# Patient Record
Sex: MALE | Race: WHITE | NOT HISPANIC OR LATINO | Employment: OTHER | URBAN - METROPOLITAN AREA
[De-identification: names, ages, dates, MRNs, and addresses within clinical notes are randomized per-mention and may not be internally consistent; named-entity substitution may affect disease eponyms.]

---

## 2020-11-02 ENCOUNTER — HOSPITAL ENCOUNTER (EMERGENCY)
Facility: MEDICAL CENTER | Age: 60
End: 2020-11-02
Attending: EMERGENCY MEDICINE
Payer: MEDICARE

## 2020-11-02 VITALS
DIASTOLIC BLOOD PRESSURE: 71 MMHG | BODY MASS INDEX: 34.07 KG/M2 | HEIGHT: 70 IN | OXYGEN SATURATION: 95 % | WEIGHT: 238 LBS | TEMPERATURE: 97.8 F | SYSTOLIC BLOOD PRESSURE: 115 MMHG | RESPIRATION RATE: 18 BRPM | HEART RATE: 88 BPM

## 2020-11-02 DIAGNOSIS — R19.8 ABNORMAL FINDINGS ON ESOPHAGOGASTRODUODENOSCOPY (EGD): ICD-10-CM

## 2020-11-02 DIAGNOSIS — Z92.83: ICD-10-CM
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
## 2020-11-02 DIAGNOSIS — T18.128A ESOPHAGEAL OBSTRUCTION DUE TO FOOD IMPACTION: ICD-10-CM

## 2020-11-02 LAB
ALBUMIN SERPL BCP-MCNC: 4.8 G/DL (ref 3.2–4.9)
ALBUMIN/GLOB SERPL: 1.2 G/DL
ALP SERPL-CCNC: 55 U/L (ref 30–99)
ALT SERPL-CCNC: 26 U/L (ref 2–50)
ANION GAP SERPL CALC-SCNC: 15 MMOL/L (ref 7–16)
AST SERPL-CCNC: 22 U/L (ref 12–45)
BASOPHILS # BLD AUTO: 0.9 % (ref 0–1.8)
BASOPHILS # BLD: 0.11 K/UL (ref 0–0.12)
BILIRUB SERPL-MCNC: 0.4 MG/DL (ref 0.1–1.5)
BUN SERPL-MCNC: 15 MG/DL (ref 8–22)
CALCIUM SERPL-MCNC: 10.2 MG/DL (ref 8.4–10.2)
CHLORIDE SERPL-SCNC: 102 MMOL/L (ref 96–112)
CO2 SERPL-SCNC: 24 MMOL/L (ref 20–33)
COVID ORDER STATUS COVID19: NORMAL
CREAT SERPL-MCNC: 0.76 MG/DL (ref 0.5–1.4)
EKG IMPRESSION: NORMAL
EOSINOPHIL # BLD AUTO: 0.26 K/UL (ref 0–0.51)
EOSINOPHIL NFR BLD: 2 % (ref 0–6.9)
ERYTHROCYTE [DISTWIDTH] IN BLOOD BY AUTOMATED COUNT: 38.1 FL (ref 35.9–50)
GLOBULIN SER CALC-MCNC: 3.9 G/DL (ref 1.9–3.5)
GLUCOSE SERPL-MCNC: 185 MG/DL (ref 65–99)
HCT VFR BLD AUTO: 48.3 % (ref 42–52)
HGB BLD-MCNC: 16.4 G/DL (ref 14–18)
IMM GRANULOCYTES # BLD AUTO: 0.05 K/UL (ref 0–0.11)
IMM GRANULOCYTES NFR BLD AUTO: 0.4 % (ref 0–0.9)
LYMPHOCYTES # BLD AUTO: 3.01 K/UL (ref 1–4.8)
LYMPHOCYTES NFR BLD: 23.7 % (ref 22–41)
MCH RBC QN AUTO: 28.6 PG (ref 27–33)
MCHC RBC AUTO-ENTMCNC: 34 G/DL (ref 33.7–35.3)
MCV RBC AUTO: 84.1 FL (ref 81.4–97.8)
MONOCYTES # BLD AUTO: 0.72 K/UL (ref 0–0.85)
MONOCYTES NFR BLD AUTO: 5.7 % (ref 0–13.4)
NEUTROPHILS # BLD AUTO: 8.54 K/UL (ref 1.82–7.42)
NEUTROPHILS NFR BLD: 67.3 % (ref 44–72)
NRBC # BLD AUTO: 0 K/UL
NRBC BLD-RTO: 0 /100 WBC
PATHOLOGY CONSULT NOTE: NORMAL
PLATELET # BLD AUTO: 333 K/UL (ref 164–446)
PMV BLD AUTO: 10.5 FL (ref 9–12.9)
POTASSIUM SERPL-SCNC: 4.1 MMOL/L (ref 3.6–5.5)
PROT SERPL-MCNC: 8.7 G/DL (ref 6–8.2)
RBC # BLD AUTO: 5.74 M/UL (ref 4.7–6.1)
SARS-COV+SARS-COV-2 AG RESP QL IA.RAPID: NOTDETECTED
SODIUM SERPL-SCNC: 141 MMOL/L (ref 135–145)
SPECIMEN SOURCE: NORMAL
WBC # BLD AUTO: 12.7 K/UL (ref 4.8–10.8)

## 2020-11-02 PROCEDURE — 700102 HCHG RX REV CODE 250 W/ 637 OVERRIDE(OP): Performed by: EMERGENCY MEDICINE

## 2020-11-02 PROCEDURE — 87426 SARSCOV CORONAVIRUS AG IA: CPT

## 2020-11-02 PROCEDURE — 93005 ELECTROCARDIOGRAM TRACING: CPT | Performed by: EMERGENCY MEDICINE

## 2020-11-02 PROCEDURE — C9803 HOPD COVID-19 SPEC COLLECT: HCPCS | Performed by: EMERGENCY MEDICINE

## 2020-11-02 PROCEDURE — 94770 HCHG CO2 EXPIRED GAS DETERMINATION: CPT | Mod: XU

## 2020-11-02 PROCEDURE — 94760 N-INVAS EAR/PLS OXIMETRY 1: CPT

## 2020-11-02 PROCEDURE — 88312 SPECIAL STAINS GROUP 1: CPT

## 2020-11-02 PROCEDURE — 85025 COMPLETE CBC W/AUTO DIFF WBC: CPT

## 2020-11-02 PROCEDURE — 160048 HCHG OR STATISTICAL LEVEL 1-5: Performed by: INTERNAL MEDICINE

## 2020-11-02 PROCEDURE — A9270 NON-COVERED ITEM OR SERVICE: HCPCS | Performed by: EMERGENCY MEDICINE

## 2020-11-02 PROCEDURE — 96374 THER/PROPH/DIAG INJ IV PUSH: CPT | Mod: XU

## 2020-11-02 PROCEDURE — 500066 HCHG BITE BLOCK, ECT: Performed by: INTERNAL MEDICINE

## 2020-11-02 PROCEDURE — 160202 HCHG ENDO MINUTES - 1ST 30 MINS LEVEL 3: Performed by: INTERNAL MEDICINE

## 2020-11-02 PROCEDURE — 700105 HCHG RX REV CODE 258: Performed by: EMERGENCY MEDICINE

## 2020-11-02 PROCEDURE — 700111 HCHG RX REV CODE 636 W/ 250 OVERRIDE (IP): Performed by: EMERGENCY MEDICINE

## 2020-11-02 PROCEDURE — 99285 EMERGENCY DEPT VISIT HI MDM: CPT

## 2020-11-02 PROCEDURE — 80053 COMPREHEN METABOLIC PANEL: CPT

## 2020-11-02 PROCEDURE — 88305 TISSUE EXAM BY PATHOLOGIST: CPT

## 2020-11-02 RX ORDER — ACETAMINOPHEN 500 MG
1000 TABLET ORAL DAILY
COMMUNITY

## 2020-11-02 RX ORDER — FENOFIBRATE 160 MG/1
160 TABLET ORAL
COMMUNITY

## 2020-11-02 RX ORDER — TAMSULOSIN HYDROCHLORIDE 0.4 MG/1
0.4 CAPSULE ORAL
COMMUNITY

## 2020-11-02 RX ORDER — NITROGLYCERIN 0.4 MG/1
0.4 TABLET SUBLINGUAL ONCE
Status: COMPLETED | OUTPATIENT
Start: 2020-11-02 | End: 2020-11-02

## 2020-11-02 RX ORDER — SODIUM CHLORIDE, SODIUM LACTATE, POTASSIUM CHLORIDE, CALCIUM CHLORIDE 600; 310; 30; 20 MG/100ML; MG/100ML; MG/100ML; MG/100ML
1000 INJECTION, SOLUTION INTRAVENOUS ONCE
Status: COMPLETED | OUTPATIENT
Start: 2020-11-02 | End: 2020-11-02

## 2020-11-02 RX ORDER — ROSUVASTATIN CALCIUM 20 MG/1
20 TABLET, COATED ORAL
COMMUNITY

## 2020-11-02 RX ORDER — PANTOPRAZOLE SODIUM 40 MG/1
40 TABLET, DELAYED RELEASE ORAL DAILY
Qty: 30 TAB | Refills: 0 | Status: SHIPPED | OUTPATIENT
Start: 2020-11-02

## 2020-11-02 RX ORDER — LISINOPRIL 20 MG/1
20 TABLET ORAL
COMMUNITY

## 2020-11-02 RX ORDER — IBUPROFEN 200 MG
400 TABLET ORAL DAILY
COMMUNITY

## 2020-11-02 RX ORDER — NATEGLINIDE 120 MG/1
120 TABLET ORAL
COMMUNITY

## 2020-11-02 RX ADMIN — SODIUM CHLORIDE, POTASSIUM CHLORIDE, SODIUM LACTATE AND CALCIUM CHLORIDE 1000 ML: 600; 310; 30; 20 INJECTION, SOLUTION INTRAVENOUS at 18:05

## 2020-11-02 RX ADMIN — PROPOFOL 50 MG: 10 INJECTION, EMULSION INTRAVENOUS at 20:19

## 2020-11-02 RX ADMIN — PROPOFOL 30 MG: 10 INJECTION, EMULSION INTRAVENOUS at 20:18

## 2020-11-02 RX ADMIN — PROPOFOL 20 MG: 10 INJECTION, EMULSION INTRAVENOUS at 20:22

## 2020-11-02 RX ADMIN — PROPOFOL 20 MG: 10 INJECTION, EMULSION INTRAVENOUS at 20:20

## 2020-11-02 RX ADMIN — NITROGLYCERIN 0.4 MG: 0.4 TABLET, ORALLY DISINTEGRATING SUBLINGUAL at 18:05

## 2020-11-02 NOTE — ED TRIAGE NOTES
Pt states was eating lunch around 1230. Pt was eating chunks of beef, mushroom and rice. Pt was taking a bite of the beef and then felt like something was in his throat. Pt states has been unable to keep water down without vomiting it up. Pt states even his saliva can set it off.     Negative covid screening

## 2020-11-03 NOTE — ED NOTES
Med rec updated and complete  Allergies reviewed  Pt had a list of medications, that he read to this writer.  Pt reports no antibiotics in the last 2 weeks

## 2020-11-03 NOTE — ED NOTES
Pt requesting water.   Spoke with ERP who states it is ok to attempt PO challenge if pt is feeling up to it.   Water provided to pt.

## 2020-11-03 NOTE — ED PROVIDER NOTES
ED Provider Note    CHIEF COMPLAINT  Chief Complaint   Patient presents with   • Other       HPI  Brandon Schumacher is a 59 y.o. male with a history of hypertension, diabetes mellitus who presents with a possible esophageal food impaction.  The patient states he was eating which included chunks of beef, mushroom, rice, when he felt like something got stuck in his throat.  He has not been able to eat or drink since that time and water and even his saliva has been coming back up.  The patient says he has never had something like this happen previously.  He feels a slight discomfort but denies any significant pain.  He has been seen by [facility] in the past and has had a previous endoscopy and colonoscopy.  He was told he had a hiatal hernia, but no history of any ulcer disease.  The patient denies any fever, chills, sore throat, cough, congestion, or any difficulty breathing.  He denies any chest pain or abdominal pain.    REVIEW OF SYSTEMS  See HPI for further details. All other systems are negative.     PAST MEDICAL HISTORY  Past Medical History:   Diagnosis Date   • Diabetes (HCC)    • Hypertension        FAMILY HISTORY  History reviewed. No pertinent family history.    SOCIAL HISTORY  Social History     Tobacco Use   • Smoking status: Never Smoker   • Smokeless tobacco: Never Used   Substance Use Topics   • Alcohol use: Not Currently   • Drug use: Not Currently      Social History     Substance and Sexual Activity   Drug Use Not Currently       SURGICAL HISTORY  History reviewed. No pertinent surgical history.    CURRENT MEDICATIONS  Home Medications     Reviewed by Ezequiel Troy (Pharmacy Tech) on 11/02/20 at 1619  Med List Status: Complete    Medication Last Dose Status    acetaminophen (TYLENOL) 500 MG Tab 11/1/2020 Active    fenofibrate (TRIGLIDE) 160 MG tablet 11/1/2020 Active    ibuprofen (MOTRIN) 200 MG Tab 11/1/2020 Active    lisinopril (PRINIVIL) 20 MG Tab  "11/1/2020 Active    multivitamin (THERAGRAN) Tab > 2 days Active    nateglinide (STARLIX) 120 MG Tab 11/1/2020 Active    rosuvastatin (CRESTOR) 20 MG Tab 11/1/2020 Active    tamsulosin (FLOMAX) 0.4 MG capsule 11/1/2020 Active                ALLERGIES  Allergies   Allergen Reactions   • Penicillin G Unspecified     Pt reports that it was a childhood allergie, not sure what happens        PHYSICAL EXAM0  VITAL SIGNS: Blood Pressure 119/72   Pulse (Abnormal) 103   Temperature 36.6 °C (97.8 °F) (Temporal)   Respiration 18   Height 1.778 m (5' 10\")   Weight 108 kg (238 lb)   Oxygen Saturation 90%   Body Mass Index 34.15 kg/m²   Constitutional: Awake, alert, in no acute distress, Non-toxic appearance.   HENT: Atraumatic. Bilateral external ears normal, mucous membranes mildly dry, nonerythematous without exudates, nose is normal.  Eyes: PERRL, EOMI, conjunctiva moist, noninjected.  Neck: Nontender, Normal range of motion, No nuchal rigidity, No stridor.   Lymphatic: No lymphadenopathy noted.   Cardiovascular: Regular rhythm, tachycardic, rate in the 110s 120s, no murmurs, rubs, gallops.  Thorax & Lungs:  Good breath sounds bilaterally, no wheezes, rales, or retractions.  No chest tenderness.  Abdomen: Bowel sounds normal, Soft, nontender, nondistended, no rebound, guarding, masses.  Back: No CVA or spinal tenderness.  Extremities: Intact distal pulses, No edema, No tenderness.   Skin: Warm, Dry, No rashes.   Musculoskeletal: No joint swelling or tenderness.  Neurologic: Alert & oriented x 3, sensory and motor function normal. No focal deficits.   Psychiatric: Affect normal, Judgment normal, Mood normal.     EKG  EKG Interpretation:  Interpreted by me    Rhythm: Sinus tachycardia  Rate: 114  Intervals: Normal  Axis: normal  Ectopy: none  Conduction: normal  ST Segments: no evidence of elevation or depression  T Waves: no acute change  Q Waves: none  Clinical Impression: No acute injury or ischemic pattern. "   Comparison to previous EKG: None      LABS  Labs Reviewed   CBC WITH DIFFERENTIAL - Abnormal; Notable for the following components:       Result Value    WBC 12.7 (*)     Neutrophils (Absolute) 8.54 (*)     All other components within normal limits   COMP METABOLIC PANEL - Abnormal; Notable for the following components:    Glucose 185 (*)     Total Protein 8.7 (*)     Globulin 3.9 (*)     All other components within normal limits   ESTIMATED GFR       All labs reviewed by me.      RADIOLOGY/PROCEDURES  No orders to display       The radiologist's interpretations of all radiological studies have been reviewed by me.        COURSE & MEDICAL DECISION MAKING  Pertinent Labs & Imaging studies reviewed. (See chart for details)  The patient presents with the above complaints.  He appears to have an esophageal food impaction.  He is not able to tolerate fluids, or handling secretions.  IV was placed, he was given a bolus of normal saline as he was tachycardic, and has had no p.o. intake for 5 to 6 hours.  He was given a nitroglycerin sublingual with no improvement of his symptoms.  When he tried to take a sip of water, this was regurgitated back.  EKG shows a sinus tachycardia.  CBC shows a white count 12,700, hemoglobin 16.4, normal differential.  Chemistry shows a glucose of 185, otherwise unremarkable.  [facility] was called, with plans to do a bedside endoscopy at 8 PM.  The endoscopy team has been contacted.      Procedure note:  EGD under moderate sedation for esophageal food impaction.  Risks and benefits were explained to the patient and consent was obtained.  Patient was placed on cardiac and respiratory monitors, supplemental oxygen.  Respiratory therapy was present.  The patient was titrated with propofol by myself and received a total of 120 mg IV over the course of the procedure.  After acquiring adequate sedation, the bedside EGD was performed by Dr. Garcia and the esophageal food obstruction  was noted, and removed.  Patient was also noted to have a developing gastric ulcer.  Biopsies were obtained by Dr. Garcia.  Shortly after the endoscope was removed the patient aroused, and was able to converse.  After about 5 minutes, the patient was fairly awake and was near back to his baseline.  The patient tolerated procedure well without complications.     On recheck at 2055 hrs., the patient is awake, alert, is back to his baseline.  He is tolerating oral fluids.  Findings were discussed with the patient.  He will be placed on Protonix 40 mg daily at the request of Dr. Garcia.  Patient is to call the office for a follow-up appointment tomorrow.  Patient is instructed to make sure he cuts his food into small pieces, chews well, to avoid another food impaction.  He is to return to the ER for any worsening pain, difficulty reading or swallowing, fever, or any other problems.    FINAL IMPRESSION  1. Esophageal obstruction due to food impaction          Electronically signed by: Anthony Hickman M.D., 11/2/2020 4:45 PM

## 2020-11-03 NOTE — OP REPORT
OP Note  Procedure date: 11/2/2020     PreOp Diagnosis: Food impaction    PostOp Diagnosis:   Esophagus:   - Food impaction in the distal esophagus, removed by gently pushed into stomach  - LA-B esophagitis, with mild stricture at GEJ  - Possible reflux esophagitis in the proximal esophagus, s/p cold forceps biopsy   Stomach:  - Some solid food remained in the stomach body  - Patchy gastritis in antrum, s/p cold forceps biopsy   Duodenum:  - Multiple superficial duodenal ulcers (Anderson III) were seen in the duodenal bulb, non-bleeding    Procedure(s):  GASTROSCOPY    Surgeon(s):  Jessenia Garcia M.D.    Anesthesiologist/Type of Anesthesia:  No anesthesia staff entered./* No anesthesia type entered *    Surgical Staff:  * No surgical staff found *    Specimens removed if any:  * No specimens in log *    Estimated Blood Loss: minimal    Anesthesia/Medications:  see ERP anesthesia note     COMPLICATIONS:  No immediate complications.    PROCEDURE IN DETAIL, Findings and ENDOSCOPIC DIAGNOSIS:      -Prior to the procedure, a History and Physical was performed, and patient medications and allergies were reviewed. The patient’s tolerance of previous anesthesia was also reviewed. The risks and benefits of the procedure and the sedation options and risks were discussed with the patient. All questions were answered, and informed consent was obtained. The patient was deemed in satisfactory condition to undergo the procedure.    -Prior Anticoagulants: the patient has taken no previous anticoagulant or antiplatelet agents.    -ASA Grade Assessment: see anesthesia note     -The patient was placed in the left lateral decubitus position. The scope was passed under direct vision. Continuous oxygen was provided via nasal cannula and intravenous sedation was administered in divided doses throughout the procedure. The patient’s blood pressure, pulse, and oxygen saturation were monitored continuously throughout the procedure.    -The  gastroscope was gently advanced under direct visualization over the tongue, down the esophagus, through the stomach and into the 2nd portion of the duodenum. The color, texture, mucosa and anatomy of esophagus, stomach and duodenum were carefully examined with the scope. The scope was then withdrawn from the patient and the procedure terminated. Further details are in the finding section, based on anatomical location.    -The patient tolerated the procedure well and there were no immediate complications. The patient was then transferred to the recovery room in stable condition.    Findings:  Esophagus:   - Food impaction in the distal esophagus, removed by gently pushed into stomach  - LA-B esophagitis, with mild stricture at GEJ  - Possible reflux esophagitis in the proximal esophagus, s/p cold forceps biopsy   Stomach:  - Some solid food remained in the stomach body  - Patchy gastritis in antrum, s/p cold forceps biopsy   Duodenum:  - Multiple superficial duodenal ulcers (Anderson III) were seen in the duodenal bulb, non-bleeding    RECOMMENDATIONS:    1. A letter will be sent regarding to the biopsy result in about 2 weeks.  2. PPI daily AC  3. F/u with Dr. Valles in 2-3 wks  4. Chew food well  5. Will likely need repeat EGD, possible balloon dilation at GEJ as outpatient  6. OK to discharge home once criteria met    11/2/2020 8:31 PM Jessenia Garcia M.D.

## 2020-11-03 NOTE — ED NOTES
Pt standing in room speaking to [facility]. Pt speaking in full sentences showing no signs of distress.

## 2020-11-03 NOTE — CONSULTS
Gastroenterology Consult Note:    Jessenia Garcia M.D.  Date & Time note created:    11/2/2020   8:02 PM     Patient ID:  Name:             Brandon Schumacher  YOB: 1960  Age:                 59 y.o.  male  MRN:               [id number]    Referring MD:  Dr. Hickman                                                             Chief Complaint(s):      Food impaction    History of Present Illness:    This is a very pleasant 59 y.o. male with the past medical history of hypertension, diabetes mellitus who presents with a possible esophageal food impaction.  The patient states he was eating which included chunks of beef, mushroom, rice, when he felt like something got stuck in his throat.  He has not been able to eat or drink since that time and water and even his saliva has been coming back up.  The patient says he has never had something like this happen previously.  If things got stuck, it usually passes within half an hour.  He feels a slight discomfort but denies any significant pain.  He has been seen by [facility] in the past and has had a previous endoscopy and colonoscopy.  He was told he had a hiatal hernia, but no history of any ulcer disease.  The patient denies any fever, chills, sore throat, cough, congestion, or any difficulty breathing.  He denies any chest pain or abdominal pain.    EGD in 2017 reviewed. No stricture or scar.     Otherwise the patient is doing fine without complaints of fever/chills/weight loss/appetite change/heartburn/nausea/vomiting/bloating/constipation/diarrhea/melena/hematochezia or abdominal pain.    Review of Systems:      Constitutional: Denies fevers, weight changes  Eyes: Denies changes in vision, jaundice  Ears/Nose/Throat/Mouth: Denies nasal congestion or sore throat   Cardiovascular: Denies chest pain or palpitations   Respiratory: Denies shortness of breath, denies cough  Gastrointestinal/Hepatic: NEg abdominal pain, nausea,  vomiting, diarrhea, constipation or GI bleeding   Genitourinary: Denies dysuria or frequency  Musculoskeletal/Rheum: Denies  joint pain and swelling, edema  Skin: Denies rash  Neurological: Denies headache, confusion, memory loss or focal weakness/parasthesias  Psychiatric: denies mood disorder   Endocrine: Genet thyroid problems  Heme/Oncology/Lymph Nodes: Denies enlarged lymph nodes, denies brusing or known bleeding disorder  All other systems were reviewed and are negative (AMA/CMS criteria)            ROS    Past Medical History:   Past Medical History:   Diagnosis Date   • Diabetes (HCC)    • Hypertension      There are no active hospital problems to display for this patient.      Past Surgical History:  History reviewed. No pertinent surgical history.    Hospital Medications:  No current facility-administered medications for this encounter.      Last reviewed on 11/2/2020  4:19 PM by Ezequiel Troy      Current Outpatient Medications:  (Not in a hospital admission)      Medication Allergy:  Allergies   Allergen Reactions   • Penicillin G Unspecified     Pt reports that it was a childhood allergie, not sure what happens        Family History:  History reviewed. No pertinent family history.    Social History:  Social History     Socioeconomic History   • Marital status:      Spouse name: Not on file   • Number of children: Not on file   • Years of education: Not on file   • Highest education level: Not on file   Occupational History   • Not on file   Social Needs   • Financial resource strain: Not on file   • Food insecurity     Worry: Not on file     Inability: Not on file   • Transportation needs     Medical: Not on file     Non-medical: Not on file   Tobacco Use   • Smoking status: Never Smoker   • Smokeless tobacco: Never Used   Substance and Sexual Activity   • Alcohol use: Not Currently   • Drug use: Not Currently   • Sexual activity: Not on file   Lifestyle   • Physical activity     Days  "per week: Not on file     Minutes per session: Not on file   • Stress: Not on file   Relationships   • Social connections     Talks on phone: Not on file     Gets together: Not on file     Attends Christianity service: Not on file     Active member of club or organization: Not on file     Attends meetings of clubs or organizations: Not on file     Relationship status: Not on file   • Intimate partner violence     Fear of current or ex partner: Not on file     Emotionally abused: Not on file     Physically abused: Not on file     Forced sexual activity: Not on file   Other Topics Concern   • Not on file   Social History Narrative   • Not on file       Physical Exam:  Weight/BMI: Body mass index is 34.15 kg/m².  /82   Pulse (!) 103   Temp 36.6 °C (97.8 °F) (Temporal)   Resp 18   Ht 1.778 m (5' 10\")   Wt 108 kg (238 lb)   SpO2 90%   Vitals:    11/02/20 1812 11/02/20 1817 11/02/20 1822 11/02/20 1922   BP: 108/64 111/73 119/72 121/82   Pulse: (!) 114 (!) 106 (!) 103    Resp: (!) 34 16 18    Temp:       TempSrc:       SpO2: 93% 92% 90%    Weight:       Height:         Oxygen Therapy:  Pulse Oximetry: 90 %, O2 Delivery Device: None - Room Air  No intake or output data in the 24 hours ending 11/02/20 2002  Physical Exam     Constitutional:   Well developed, well nourished, no acute distress  HEENT:  Normocephalic, Atraumatic, Conjunctiva not pale, Sclera not icteric, Oropharynx moist mucous membranes, No oral exudates, Nose normal.  No thyromegaly.  Neck:  Normal range of motion, No cervical tenderness,  no JVD.  Chest/Lungs:  Symmetric expansion, no spider angioma, breath sounds clear to auscultation bilaterally,  no crackles, no wheezing.   Cardiovascular:  Normal heart rate, Normal rhythm, No murmurs, No rubs, No gallops.    Abdomen: Bowel sounds normal, Soft, No tenderness, No guarding, No rebound, No masses, No hepatosplenomegaly.  Extremities: No cyanosis/clubbing/edema/palmar erythema/flapping tremor  Skin: " Warm, Dry, No erythema, No rash, no induration.    MDM (Data Review):     Records reviewed and summarized in current documentation    Lab Data Review:  Recent Results (from the past 24 hour(s))   EKG    Collection Time: 20  4:52 PM   Result Value Ref Range    Report       [facility]    Test Date:  2020  Pt Name:    ISAURA CHILD             Department: [facility]  MRN:        [id number]                      [facility]  Gender:     Male                         Technician: LEVI  :        1960                   Requested By:DULCE MARIA BLANCO  Order #:    [id number]                    Reading MD: DULCE MARIA BLANCO MD    Measurements  Intervals                                Axis  Rate:       114                          P:          27  NY:         179                          QRS:        40  QRSD:       90                           T:          28  QT:         328  QTc:        452    Interpretive Statements  Sinus tachycardia  No previous ECG available for comparison  Electronically Signed On 2020 18:39:51 PST by DULCE MARIA BLANCO MD     CBC WITH DIFFERENTIAL    Collection Time: 20  5:58 PM   Result Value Ref Range    WBC 12.7 (H) 4.8 - 10.8 K/uL    RBC 5.74 4.70 - 6.10 M/uL    Hemoglobin 16.4 14.0 - 18.0 g/dL    Hematocrit 48.3 42.0 - 52.0 %    MCV 84.1 81.4 - 97.8 fL    MCH 28.6 27.0 - 33.0 pg    MCHC 34.0 33.7 - 35.3 g/dL    RDW 38.1 35.9 - 50.0 fL    Platelet Count 333 164 - 446 K/uL    MPV 10.5 9.0 - 12.9 fL    Neutrophils-Polys 67.30 44.00 - 72.00 %    Lymphocytes 23.70 22.00 - 41.00 %    Monocytes 5.70 0.00 - 13.40 %    Eosinophils 2.00 0.00 - 6.90 %    Basophils 0.90 0.00 - 1.80 %    Immature Granulocytes 0.40 0.00 - 0.90 %    Nucleated RBC 0.00 /100 WBC    Neutrophils (Absolute) 8.54 (H) 1.82 - 7.42 K/uL    Lymphs (Absolute) 3.01 1.00 - 4.80 K/uL    Monos (Absolute) 0.72 0.00 - 0.85 K/uL    Eos (Absolute) 0.26 0.00 - 0.51 K/uL    Baso (Absolute)  0.11 0.00 - 0.12 K/uL    Immature Granulocytes (abs) 0.05 0.00 - 0.11 K/uL    NRBC (Absolute) 0.00 K/uL   Comp Metabolic Panel    Collection Time: 11/02/20  5:58 PM   Result Value Ref Range    Sodium 141 135 - 145 mmol/L    Potassium 4.1 3.6 - 5.5 mmol/L    Chloride 102 96 - 112 mmol/L    Co2 24 20 - 33 mmol/L    Anion Gap 15.0 7.0 - 16.0    Glucose 185 (H) 65 - 99 mg/dL    Bun 15 8 - 22 mg/dL    Creatinine 0.76 0.50 - 1.40 mg/dL    Calcium 10.2 8.4 - 10.2 mg/dL    AST(SGOT) 22 12 - 45 U/L    ALT(SGPT) 26 2 - 50 U/L    Alkaline Phosphatase 55 30 - 99 U/L    Total Bilirubin 0.4 0.1 - 1.5 mg/dL    Albumin 4.8 3.2 - 4.9 g/dL    Total Protein 8.7 (H) 6.0 - 8.2 g/dL    Globulin 3.9 (H) 1.9 - 3.5 g/dL    A-G Ratio 1.2 g/dL   ESTIMATED GFR    Collection Time: 11/02/20  5:58 PM   Result Value Ref Range    GFR If African American >60 >60 mL/min/1.73 m 2    GFR If Non African American >60 >60 mL/min/1.73 m 2   COVID/SARS CoV-2 PCR    Collection Time: 11/02/20  7:14 PM    Specimen: Nasopharyngeal; Respirate   Result Value Ref Range    COVID Order Status Received    SARS-COV Antigen RIVER    Collection Time: 11/02/20  7:14 PM   Result Value Ref Range    SARS-CoV-2 Source Nasal Swab     SARS-COV ANTIGEN RIVER NotDetected Not-Detected       MDM (Assessment and Plan):     There are no active hospital problems to display for this patient.      Imaging/Procedures Review:    No orders to display       Assessment  Food impaction    Plan  NPO  Food disimpaction    Risks, benefits, and alternatives were discussed with patient. Consenting persons were given an opportunity to ask questions and discuss other options. Risks including but not limited to failed or incomplete endoscopy, ineffective therapy, perforation, infection, bleeding, missed lesion(s), cardiac and/or pulmonary event, aspiration, stroke, possible need for surgery, hospitalization possibly prolonged, discomfort, unsuccessful and/or incomplete procedure, possible need for  repeat procedures and/or additional testings, damage to adjacent organs and/or vascular structures, medication reaction, disability, death, and other adverse events possibly life-threatening. Discussion was undertaken with Layman's terms. Consenting persons stated understanding and acceptance of these risks, and wished to proceed. Consent was given in clear state of mind. All questions were answered.    Thank you very much for allowing me to participate in the care of your patient.  Please feel free to contact me anytime at [phone].     Jessenia Garcia M.D.    Core Quality Measures   Reviewed items::  Labs, Medications and Radiology reports reviewed

## (undated) DEVICE — FORCEP RJ4 JUMBO BIOPSY (40EA/BX)

## (undated) DEVICE — BITE BLOCK ADULT 60FR (100EA/CA)

## (undated) DEVICE — KIT CUSTOM PROCEDURE SINGLE FOR ENDO  (15/CA)